# Patient Record
Sex: FEMALE | Race: WHITE | Employment: OTHER
[De-identification: names, ages, dates, MRNs, and addresses within clinical notes are randomized per-mention and may not be internally consistent; named-entity substitution may affect disease eponyms.]

---

## 2022-12-09 PROBLEM — L97.922 NON-PRESSURE CHRONIC ULCER OF LEFT LOWER LEG WITH FAT LAYER EXPOSED (HCC): Status: ACTIVE | Noted: 2022-12-09

## 2022-12-09 PROBLEM — I87.309 VENOUS HYPERTENSION: Status: ACTIVE | Noted: 2022-12-09

## 2023-07-19 PROBLEM — S91.302A OPEN WOUND OF LEFT FOOT: Status: ACTIVE | Noted: 2023-07-19

## 2023-07-19 PROBLEM — I73.9 PERIPHERAL VASCULAR DISEASE (HCC): Status: ACTIVE | Noted: 2023-07-19

## 2023-07-31 PROBLEM — I70.221 CRITICAL LIMB ISCHEMIA OF RIGHT LOWER EXTREMITY (HCC): Status: ACTIVE | Noted: 2023-07-31

## 2023-08-23 PROBLEM — Z95.2 HISTORY OF AORTIC VALVE REPLACEMENT: Status: ACTIVE | Noted: 2023-08-23

## 2024-01-11 PROBLEM — R07.89 OTHER CHEST PAIN: Status: ACTIVE | Noted: 2024-01-11

## 2024-05-08 PROBLEM — I70.221 CRITICAL LIMB ISCHEMIA OF RIGHT LOWER EXTREMITY (HCC): Status: RESOLVED | Noted: 2023-07-31 | Resolved: 2024-05-08

## 2024-05-08 PROBLEM — L97.922 NON-PRESSURE CHRONIC ULCER OF LEFT LOWER LEG WITH FAT LAYER EXPOSED (HCC): Status: RESOLVED | Noted: 2022-12-09 | Resolved: 2024-05-08

## 2024-09-08 PROBLEM — Z79.01 ANTICOAGULATED: Status: ACTIVE | Noted: 2024-09-08

## 2024-09-08 PROBLEM — I25.810: Status: ACTIVE | Noted: 2024-09-08

## 2024-09-08 PROBLEM — K92.2 GIB (GASTROINTESTINAL BLEEDING): Status: ACTIVE | Noted: 2024-09-08

## 2024-09-08 PROBLEM — K62.5 RECTAL BLEEDING: Status: ACTIVE | Noted: 2024-09-08

## 2024-09-08 PROBLEM — C18.7 ADENOCARCINOMA OF SIGMOID COLON (HCC): Status: ACTIVE | Noted: 2024-09-08

## 2024-09-09 PROBLEM — C18.7 CANCER OF SIGMOID COLON (HCC): Status: ACTIVE | Noted: 2024-09-08

## 2024-09-13 PROBLEM — K62.5 RECTAL BLEEDING: Status: RESOLVED | Noted: 2024-09-08 | Resolved: 2024-09-13

## 2024-09-13 PROBLEM — Z79.01 ANTICOAGULATED: Status: RESOLVED | Noted: 2024-09-08 | Resolved: 2024-09-13

## 2024-09-13 PROBLEM — K92.2 GIB (GASTROINTESTINAL BLEEDING): Status: RESOLVED | Noted: 2024-09-08 | Resolved: 2024-09-13

## 2024-09-17 ENCOUNTER — CARE COORDINATION (OUTPATIENT)
Facility: CLINIC | Age: 79
End: 2024-09-17

## 2024-09-20 PROBLEM — G30.9 ALZHEIMER'S DISEASE, UNSPECIFIED (CODE) (HCC): Status: ACTIVE | Noted: 2024-09-20

## 2024-09-20 PROBLEM — I35.0 AORTIC STENOSIS: Status: ACTIVE | Noted: 2024-09-20

## 2024-10-09 ENCOUNTER — CARE COORDINATION (OUTPATIENT)
Facility: CLINIC | Age: 79
End: 2024-10-09

## 2024-10-09 NOTE — CARE COORDINATION
Care Transitions Note    Follow Up Call     Patient Current Location:  [place]    Care Transition Nurse contacted the family, Jeanne Jha   by telephone. Verified name and  as identifiers.    Additional needs identified to be addressed with provider   No needs identified                 Method of communication with provider: none.    Care Summary Note:     Family member reports:   - patient is doing well   - procedure is scheduled for Friday   - Patient ambulatory   - No needs at this time         Assessments:  No changes since last call      Follow Up Appointment:     Future Appointments         Provider Specialty Dept Phone    10/29/2024 3:30 PM George Vasquez, APRN - NP Colon and Rectal Surgery [phone]    2024 1:45 PM Christian Mike MD Primary Care [phone]    2024 3:15 PM George Vasquez, LENNIE - NP Colon and Rectal Surgery [phone]            Care Transition Nurse provided contact information.  Plan for follow-up call in 2-5 days based on severity of symptoms and risk factors.  Plan for next call:   - Follow up with condition of patient   - Follow up with advanced care planning as able   - Assess for resolution of the Care Transitions program     Sara Avila RN

## 2024-10-16 PROBLEM — G30.9 ALZHEIMER'S DISEASE, UNSPECIFIED (CODE): Status: RESOLVED | Noted: 2024-09-20 | Resolved: 2024-10-16

## 2024-10-22 PROBLEM — I50.33 ACUTE ON CHRONIC HEART FAILURE WITH NORMAL EJECTION FRACTION (HCC): Status: ACTIVE | Noted: 2024-10-22

## 2024-10-22 PROBLEM — I50.21 ACUTE SYSTOLIC CHF (CONGESTIVE HEART FAILURE) (HCC): Status: ACTIVE | Noted: 2024-10-22

## 2024-11-06 PROBLEM — I35.0 AORTIC STENOSIS, SEVERE: Status: ACTIVE | Noted: 2024-11-06

## 2024-12-12 PROBLEM — I50.32 HYPERTENSIVE HEART DISEASE WITH CHRONIC DIASTOLIC CONGESTIVE HEART FAILURE (HCC): Status: ACTIVE | Noted: 2024-12-12

## 2024-12-12 PROBLEM — I11.0 HYPERTENSIVE HEART DISEASE WITH CHRONIC DIASTOLIC CONGESTIVE HEART FAILURE (HCC): Status: ACTIVE | Noted: 2024-12-12

## 2025-01-29 PROBLEM — I50.33 ACUTE ON CHRONIC HEART FAILURE WITH NORMAL EJECTION FRACTION (HCC): Status: RESOLVED | Noted: 2024-10-22 | Resolved: 2025-01-29

## 2025-01-29 PROBLEM — N18.32 STAGE 3B CHRONIC KIDNEY DISEASE (HCC): Status: ACTIVE | Noted: 2022-10-03

## 2025-03-06 PROBLEM — I70.222 CRITICAL LIMB ISCHEMIA OF LEFT LOWER EXTREMITY: Status: ACTIVE | Noted: 2025-03-05

## 2025-03-24 PROBLEM — I70.245 CRITICAL LIMB ISCHEMIA OF LEFT LOWER EXTREMITY WITH ULCERATION OF FOOT: Status: ACTIVE | Noted: 2025-03-24

## 2025-04-09 ENCOUNTER — CARE COORDINATION (OUTPATIENT)
Facility: CLINIC | Age: 80
End: 2025-04-09

## 2025-04-09 NOTE — CARE COORDINATION
MD Primary Care [phone]    1/27/2026 1:45 PM Christian Mike MD Primary Care [phone]            Care Transition Nurse provided contact information.  Plan for follow-up call in 6-10 days based on severity of symptoms and risk factors.  Plan for next call:   - Attempt to follow up with advanced care plan or planning    - Attempt to follow up with BP   - Follow up with home health services   - Refer to PCP for follow up as needed      Sara Avila RN

## 2025-04-17 ENCOUNTER — CARE COORDINATION (OUTPATIENT)
Facility: CLINIC | Age: 80
End: 2025-04-17

## 2025-04-17 NOTE — CARE COORDINATION
Care Transitions Note    Follow Up Call     Patient Current Location:  [place]    Care Transition Nurse contacted the  Jeanne Jha   by telephone. Verified name and  as identifiers.    Additional needs identified to be addressed with provider   No needs identified                 Method of communication with provider: none.    Care Summary Note:   Family member reports:   - So far so good   - home health care staff are providing visits to patient   - patient attended appointment with Vascular Surgeon and to return in 3 months   - BP runs a little high usually but it has been pretty decent.    No needs at this time.   Family member reports  - We got it      Plan of care updates since last contact:  Care Transitions Nurse ( CTN)  referred family member to home health care staff or medical providers should there be any concern regarding patient's blood pressure.          Medication Review:  No medication changes noted or reported since last care coordinator outreach     Remote Patient Monitoring:  Offered patient enrollment in the Remote Patient Monitoring (RPM) program for in-home monitoring:  n/a     Assessments:  No changes since last call per phone outreach with family member.     Follow Up Appointment:   Patient to follow up with Dr. Mares as below for wound care. Family advises next appointment is on 25.    Future Appointments         Provider Specialty Dept Phone    2025 1:15 PM Mary Mares DO Wound Ostomy [phone]    2025 2:45 PM Veronica Arora, APRN - CNP Colon and Rectal Surgery [phone]    2025 3:15 PM Raymond Avila MD Cardiology [phone]    2025 11:30 AM Michael Saleem MD Cardiology [phone]    2025 8:00 AM [facility] Vascular Surgery [phone]    2025 8:30 AM [facility] [place] [facility] [phone]    2025 9:00 AM Jone Brown MD Vascular Surgery [phone]    2025 10:45

## 2025-05-01 ENCOUNTER — CARE COORDINATION (OUTPATIENT)
Facility: CLINIC | Age: 80
End: 2025-05-01

## 2025-05-01 NOTE — CARE COORDINATION
Care Transitions Note    Final Call     Patient Current Location:  [place]    Care Transition Nurse contacted the family, Jeanne Jha, (PHI form verified; on file) by telephone. Verified name and  as identifiers.    Patient graduated from the Care Transitions program on 2025 .  Patient/family verbalizes confidence in the ability to self-manage at this time..          Handoff:   Patient was not referred to the ACM team due to  family declined further follow up.      Care Summary Note:   Family member reports:   - Patient is hanging in there   - Patient attending [facility]     No needs reported at this time.     Assessments:  No changes since last call per phone outreach.     Upcoming Appointments:    Future Appointments         Provider Specialty Dept Phone    2025 3:15 PM Raymond Avila MD Cardiology [phone]    2025 1:00 PM Kera Blake PA-C Wound Ostomy [phone]    2025 11:30 AM Michael Saleem MD Cardiology [phone]    2025 8:00 AM [facility] Vascular Surgery [phone]    2025 8:30 AM [facility] [place] [facility] [phone]    2025 9:00 AM Jone Brown MD Vascular Surgery [phone]    2025 10:45 AM Christian Mike MD Primary Care [phone]    2025 1:00 PM George Vasquez, APRN - NP Colon and Rectal Surgery [phone]    2026 1:45 PM Christian Mike MD Primary Care [phone]            Patient / family has agreed to contact primary care provider and/or specialist for any further questions, concerns, or needs.    Sara Avila RN